# Patient Record
Sex: FEMALE | Race: WHITE | HISPANIC OR LATINO
[De-identification: names, ages, dates, MRNs, and addresses within clinical notes are randomized per-mention and may not be internally consistent; named-entity substitution may affect disease eponyms.]

---

## 2018-03-19 ENCOUNTER — APPOINTMENT (OUTPATIENT)
Dept: ANTEPARTUM | Facility: CLINIC | Age: 33
End: 2018-03-19

## 2018-03-19 PROBLEM — Z00.00 ENCOUNTER FOR PREVENTIVE HEALTH EXAMINATION: Status: ACTIVE | Noted: 2018-03-19

## 2023-05-22 ENCOUNTER — EMERGENCY (EMERGENCY)
Facility: HOSPITAL | Age: 38
LOS: 1 days | Discharge: HOME | End: 2023-05-22
Admitting: EMERGENCY MEDICINE
Payer: MEDICAID

## 2023-05-22 VITALS
RESPIRATION RATE: 17 BRPM | HEART RATE: 84 BPM | OXYGEN SATURATION: 100 % | TEMPERATURE: 98 F | DIASTOLIC BLOOD PRESSURE: 79 MMHG | SYSTOLIC BLOOD PRESSURE: 117 MMHG

## 2023-05-22 LAB
APPEARANCE UR: ABNORMAL
BACTERIA # UR AUTO: ABNORMAL
BILIRUB UR-MCNC: NEGATIVE — SIGNIFICANT CHANGE UP
COLOR SPEC: SIGNIFICANT CHANGE UP
DIFF PNL FLD: ABNORMAL
EPI CELLS # UR: 24 /HPF — HIGH (ref 0–5)
GLUCOSE UR QL: NEGATIVE — SIGNIFICANT CHANGE UP
HYALINE CASTS # UR AUTO: 1 /LPF — SIGNIFICANT CHANGE UP (ref 0–7)
KETONES UR-MCNC: NEGATIVE — SIGNIFICANT CHANGE UP
LEUKOCYTE ESTERASE UR-ACNC: NEGATIVE — SIGNIFICANT CHANGE UP
NITRITE UR-MCNC: NEGATIVE — SIGNIFICANT CHANGE UP
PH UR: 6.5 — SIGNIFICANT CHANGE UP (ref 5–8)
PROT UR-MCNC: ABNORMAL
RBC CASTS # UR COMP ASSIST: 3 /HPF — SIGNIFICANT CHANGE UP (ref 0–4)
SP GR SPEC: 1.02 — SIGNIFICANT CHANGE UP (ref 1.01–1.05)
UROBILINOGEN FLD QL: SIGNIFICANT CHANGE UP
WBC UR QL: 9 /HPF — HIGH (ref 0–5)

## 2023-05-22 PROCEDURE — 99284 EMERGENCY DEPT VISIT MOD MDM: CPT

## 2023-05-22 RX ORDER — HYDROCORTISONE 1 %
1 OINTMENT (GRAM) TOPICAL
Qty: 1 | Refills: 0
Start: 2023-05-22 | End: 2023-05-25

## 2023-05-22 RX ORDER — FAMOTIDINE 10 MG/ML
20 INJECTION INTRAVENOUS ONCE
Refills: 0 | Status: COMPLETED | OUTPATIENT
Start: 2023-05-22 | End: 2023-05-22

## 2023-05-22 RX ADMIN — Medication 30 MILLILITER(S): at 16:51

## 2023-05-22 RX ADMIN — FAMOTIDINE 20 MILLIGRAM(S): 10 INJECTION INTRAVENOUS at 16:51

## 2023-05-22 NOTE — ED ADULT TRIAGE NOTE - CHIEF COMPLAINT QUOTE
Pt c/o of hernia and urinary tract infection for one week. Patient denies any recent treatment for the urinary tract infection. Denies chest pain, headache, dizziness. Hx [id number]

## 2023-05-22 NOTE — ED PROVIDER NOTE - NSFOLLOWUPINSTRUCTIONS_ED_ALL_ED_FT
Follow up with general surgery for abdominal hernia.   Advance activity as tolerated.  Continue all previously prescribed medications as directed unless otherwise instructed.  Follow up with your primary care physician in 48-72 hours- bring copies of your results.  Return to the ER for worsening or persistent symptoms, and/or ANY NEW OR CONCERNING SYMPTOMS: nausea, fever, vomiting, diarrhea, constipation, worsening pain. If you have issues obtaining follow up, please call: [phone] to obtain a doctor or specialist who takes your insurance in your area.  You may call [phone] to make an appointment with the internal medicine clinic.

## 2023-05-22 NOTE — ED PROVIDER NOTE - PATIENT PORTAL LINK FT
You can access the FollowMyHealth Patient Portal offered by [facility] by registering at the following website: [email or web address]. By joining [employer]’s FollowMyHealth portal, you will also be able to view your health information using other applications (apps) compatible with our system.

## 2023-05-22 NOTE — ED PROVIDER NOTE - CLINICAL SUMMARY MEDICAL DECISION MAKING FREE TEXT BOX
pt here for a lump on epigastric area, concern for hernia. also has abnormal discharge. exam found no lump on abdomen. no abdominal wall defect found. abd is soft, nontender. the "lump" that pt points to when getting up is her abdominal muscle and subcutaneous tissue which bulges out when she sits. it is non tender, and goes away when pt sucks in her abdomen.  pt then admits to gaining wt recently and her clothes does not fit. pt has a pcp who checked her thyroid function which was normal.   pt will continue f/u with pcp.  recommended gen surg for hernia as pt request although unlikely.    UA negative for UTI. urine culture pending.

## 2023-05-22 NOTE — ED PROVIDER NOTE - OBJECTIVE STATEMENT
38-year-old female with hypertension, complaining of "a lump" on her epigastric area with pain sometimes for 3 months. The lump will go away when she lays down, but will show up when she sits, walks, and any other activities.  Denies nausea, vomiting, diarrhea, fever.  Patient saw her primary care for this problem and was told to take Tylenol with minimal improvement. last bm was this morning and denies blood in stool.   Patient also complaining of vaginal itchiness for 1 week.  History of kidney stone.  Itchiness is around the vulvar area and goes into the vagina.  Also started to have some brown discharge this morning.  LMP was May 1.  Sexually active with 1 male partner.  Denies pelvic pain, dysuria, urinary frequency or urgency, hematuria.

## 2023-05-22 NOTE — ED PROVIDER NOTE - PHYSICAL EXAMINATION
CONSTITUTIONAL: Well-appearing; well-nourished; in no apparent distress. Non-toxic appearing.   NEURO: Alert & oriented. Gait steady without assistance. Sensory and motor functions are grossly intact.  PSYCH: Mood appropriate. Thought processes intact.   NECK: Supple  CARD: Regular rate and rhythm, no murmurs  RESP: No accessory muscle use; breath sounds clear and equal bilaterally; no wheezes, rhonchi, or rales     ABD: no epigastric or other abdominal mass found. Soft; non-distended; non-tender.   MUSCULOSKELETAL/EXTREMITIES: FROM in all four extremities; no extremity edema.  SKIN: Warm; dry; no apparent lesions or exudate  PELVIC: brown vag discharge. no CMT. non tender. chaperoned by PCA.

## 2023-05-23 LAB
C TRACH RRNA SPEC QL NAA+PROBE: SIGNIFICANT CHANGE UP
CULTURE RESULTS: SIGNIFICANT CHANGE UP
N GONORRHOEA RRNA SPEC QL NAA+PROBE: SIGNIFICANT CHANGE UP
SPECIMEN SOURCE: SIGNIFICANT CHANGE UP

## 2023-09-18 ENCOUNTER — APPOINTMENT (OUTPATIENT)
Dept: SURGERY | Facility: CLINIC | Age: 38
End: 2023-09-18

## 2023-10-02 ENCOUNTER — APPOINTMENT (OUTPATIENT)
Dept: SURGERY | Facility: CLINIC | Age: 38
End: 2023-10-02
Payer: MEDICAID

## 2023-10-02 VITALS
SYSTOLIC BLOOD PRESSURE: 116 MMHG | WEIGHT: 200 LBS | DIASTOLIC BLOOD PRESSURE: 71 MMHG | HEART RATE: 84 BPM | BODY MASS INDEX: 37.76 KG/M2 | TEMPERATURE: 97.9 F | HEIGHT: 61 IN

## 2023-10-02 DIAGNOSIS — I10 ESSENTIAL (PRIMARY) HYPERTENSION: ICD-10-CM

## 2023-10-02 PROCEDURE — 45300 PROCTOSIGMOIDOSCOPY DX: CPT

## 2023-10-02 PROCEDURE — 99203 OFFICE O/P NEW LOW 30 MIN: CPT | Mod: 25

## 2023-10-02 RX ORDER — METOPROLOL TARTRATE 75 MG/1
TABLET, FILM COATED ORAL
Refills: 0 | Status: ACTIVE | COMMUNITY

## 2023-10-02 RX ORDER — LOSARTAN POTASSIUM 100 MG/1
TABLET, FILM COATED ORAL
Refills: 0 | Status: ACTIVE | COMMUNITY

## 2023-10-26 ENCOUNTER — OUTPATIENT (OUTPATIENT)
Dept: OUTPATIENT SERVICES | Facility: HOSPITAL | Age: 38
LOS: 1 days | Discharge: HOME | End: 2023-10-26

## 2023-10-26 VITALS
SYSTOLIC BLOOD PRESSURE: 132 MMHG | RESPIRATION RATE: 18 BRPM | WEIGHT: 203.27 LBS | TEMPERATURE: 98 F | HEIGHT: 61 IN | HEART RATE: 99 BPM | DIASTOLIC BLOOD PRESSURE: 91 MMHG | OXYGEN SATURATION: 98 %

## 2023-10-26 DIAGNOSIS — I10 ESSENTIAL (PRIMARY) HYPERTENSION: ICD-10-CM

## 2023-10-26 DIAGNOSIS — Z01.818 ENCOUNTER FOR OTHER PREPROCEDURAL EXAMINATION: ICD-10-CM

## 2023-10-26 DIAGNOSIS — K43.9 VENTRAL HERNIA WITHOUT OBSTRUCTION OR GANGRENE: ICD-10-CM

## 2023-10-26 LAB
ANION GAP SERPL CALC-SCNC: 6 MMOL/L — SIGNIFICANT CHANGE UP (ref 5–17)
ANION GAP SERPL CALC-SCNC: 6 MMOL/L — SIGNIFICANT CHANGE UP (ref 5–17)
BASOPHILS # BLD AUTO: 0.04 K/UL — SIGNIFICANT CHANGE UP (ref 0–0.2)
BASOPHILS # BLD AUTO: 0.04 K/UL — SIGNIFICANT CHANGE UP (ref 0–0.2)
BASOPHILS NFR BLD AUTO: 0.5 % — SIGNIFICANT CHANGE UP (ref 0–2)
BASOPHILS NFR BLD AUTO: 0.5 % — SIGNIFICANT CHANGE UP (ref 0–2)
BUN SERPL-MCNC: 24 MG/DL — HIGH (ref 7–23)
BUN SERPL-MCNC: 24 MG/DL — HIGH (ref 7–23)
CALCIUM SERPL-MCNC: 8.5 MG/DL — SIGNIFICANT CHANGE UP (ref 8.5–10.1)
CALCIUM SERPL-MCNC: 8.5 MG/DL — SIGNIFICANT CHANGE UP (ref 8.5–10.1)
CHLORIDE SERPL-SCNC: 112 MMOL/L — HIGH (ref 96–108)
CHLORIDE SERPL-SCNC: 112 MMOL/L — HIGH (ref 96–108)
CO2 SERPL-SCNC: 22 MMOL/L — SIGNIFICANT CHANGE UP (ref 22–31)
CO2 SERPL-SCNC: 22 MMOL/L — SIGNIFICANT CHANGE UP (ref 22–31)
CREAT SERPL-MCNC: 1.45 MG/DL — HIGH (ref 0.5–1.3)
CREAT SERPL-MCNC: 1.45 MG/DL — HIGH (ref 0.5–1.3)
EGFR: 47 ML/MIN/1.73M2 — LOW
EGFR: 47 ML/MIN/1.73M2 — LOW
EOSINOPHIL # BLD AUTO: 0.07 K/UL — SIGNIFICANT CHANGE UP (ref 0–0.5)
EOSINOPHIL # BLD AUTO: 0.07 K/UL — SIGNIFICANT CHANGE UP (ref 0–0.5)
EOSINOPHIL NFR BLD AUTO: 0.9 % — SIGNIFICANT CHANGE UP (ref 0–6)
EOSINOPHIL NFR BLD AUTO: 0.9 % — SIGNIFICANT CHANGE UP (ref 0–6)
GLUCOSE SERPL-MCNC: 130 MG/DL — HIGH (ref 70–99)
GLUCOSE SERPL-MCNC: 130 MG/DL — HIGH (ref 70–99)
HCG SERPL-ACNC: <1 MIU/ML — SIGNIFICANT CHANGE UP
HCG SERPL-ACNC: <1 MIU/ML — SIGNIFICANT CHANGE UP
HCT VFR BLD CALC: 34.9 % — SIGNIFICANT CHANGE UP (ref 34.5–45)
HCT VFR BLD CALC: 34.9 % — SIGNIFICANT CHANGE UP (ref 34.5–45)
HGB BLD-MCNC: 11 G/DL — LOW (ref 11.5–15.5)
HGB BLD-MCNC: 11 G/DL — LOW (ref 11.5–15.5)
IMM GRANULOCYTES NFR BLD AUTO: 0.9 % — SIGNIFICANT CHANGE UP (ref 0–0.9)
IMM GRANULOCYTES NFR BLD AUTO: 0.9 % — SIGNIFICANT CHANGE UP (ref 0–0.9)
LYMPHOCYTES # BLD AUTO: 2.05 K/UL — SIGNIFICANT CHANGE UP (ref 1–3.3)
LYMPHOCYTES # BLD AUTO: 2.05 K/UL — SIGNIFICANT CHANGE UP (ref 1–3.3)
LYMPHOCYTES # BLD AUTO: 26.1 % — SIGNIFICANT CHANGE UP (ref 13–44)
LYMPHOCYTES # BLD AUTO: 26.1 % — SIGNIFICANT CHANGE UP (ref 13–44)
MCHC RBC-ENTMCNC: 23.8 PG — LOW (ref 27–34)
MCHC RBC-ENTMCNC: 23.8 PG — LOW (ref 27–34)
MCHC RBC-ENTMCNC: 31.5 G/DL — LOW (ref 32–36)
MCHC RBC-ENTMCNC: 31.5 G/DL — LOW (ref 32–36)
MCV RBC AUTO: 75.5 FL — LOW (ref 80–100)
MCV RBC AUTO: 75.5 FL — LOW (ref 80–100)
MONOCYTES # BLD AUTO: 0.49 K/UL — SIGNIFICANT CHANGE UP (ref 0–0.9)
MONOCYTES # BLD AUTO: 0.49 K/UL — SIGNIFICANT CHANGE UP (ref 0–0.9)
MONOCYTES NFR BLD AUTO: 6.3 % — SIGNIFICANT CHANGE UP (ref 2–14)
MONOCYTES NFR BLD AUTO: 6.3 % — SIGNIFICANT CHANGE UP (ref 2–14)
NEUTROPHILS # BLD AUTO: 5.12 K/UL — SIGNIFICANT CHANGE UP (ref 1.8–7.4)
NEUTROPHILS # BLD AUTO: 5.12 K/UL — SIGNIFICANT CHANGE UP (ref 1.8–7.4)
NEUTROPHILS NFR BLD AUTO: 65.3 % — SIGNIFICANT CHANGE UP (ref 43–77)
NEUTROPHILS NFR BLD AUTO: 65.3 % — SIGNIFICANT CHANGE UP (ref 43–77)
NRBC # BLD: 0 /100 WBCS — SIGNIFICANT CHANGE UP (ref 0–0)
NRBC # BLD: 0 /100 WBCS — SIGNIFICANT CHANGE UP (ref 0–0)
PLATELET # BLD AUTO: 367 K/UL — SIGNIFICANT CHANGE UP (ref 150–400)
PLATELET # BLD AUTO: 367 K/UL — SIGNIFICANT CHANGE UP (ref 150–400)
POTASSIUM SERPL-MCNC: 4 MMOL/L — SIGNIFICANT CHANGE UP (ref 3.5–5.3)
POTASSIUM SERPL-MCNC: 4 MMOL/L — SIGNIFICANT CHANGE UP (ref 3.5–5.3)
POTASSIUM SERPL-SCNC: 4 MMOL/L — SIGNIFICANT CHANGE UP (ref 3.5–5.3)
POTASSIUM SERPL-SCNC: 4 MMOL/L — SIGNIFICANT CHANGE UP (ref 3.5–5.3)
RBC # BLD: 4.62 M/UL — SIGNIFICANT CHANGE UP (ref 3.8–5.2)
RBC # BLD: 4.62 M/UL — SIGNIFICANT CHANGE UP (ref 3.8–5.2)
RBC # FLD: 15.1 % — HIGH (ref 10.3–14.5)
RBC # FLD: 15.1 % — HIGH (ref 10.3–14.5)
SODIUM SERPL-SCNC: 140 MMOL/L — SIGNIFICANT CHANGE UP (ref 135–145)
SODIUM SERPL-SCNC: 140 MMOL/L — SIGNIFICANT CHANGE UP (ref 135–145)
WBC # BLD: 7.84 K/UL — SIGNIFICANT CHANGE UP (ref 3.8–10.5)
WBC # BLD: 7.84 K/UL — SIGNIFICANT CHANGE UP (ref 3.8–10.5)
WBC # FLD AUTO: 7.84 K/UL — SIGNIFICANT CHANGE UP (ref 3.8–10.5)
WBC # FLD AUTO: 7.84 K/UL — SIGNIFICANT CHANGE UP (ref 3.8–10.5)

## 2023-10-26 NOTE — H&P PST ADULT - NSANTHOSAYNRD_GEN_A_CORE
No. [place] screening performed.  STOP BANG Legend: 0-2 = LOW Risk; 3-4 = INTERMEDIATE Risk; 5-8 = HIGH Risk

## 2023-10-26 NOTE — H&P PST ADULT - PROBLEM SELECTOR PLAN 3
Preop instructions provided including NPO status. Hibiclens wash for infection control. Patient aware to stop NSAID, OTC herbals  for 7-10 days, needs to be accompanied  by adult upon discharge.  Patient verbalized understanding  cardio /medical clearance   anesthesiologist to review pst labs, ekg, medical clearances and optimization for surgery

## 2023-10-26 NOTE — H&P PST ADULT - HISTORY OF PRESENT ILLNESS
39 yo  female , pmh- palpitations cardia arrhythmia - with  epigastric hernia - scheduled for  epigastric hernia repair   spoke in patient's native language   denies recent travels in the past 30 days. No fever, SOB, cough, flu like symptoms or body rash- covid screen

## 2023-10-26 NOTE — H&P PST ADULT - ASSESSMENT
epigastric hernia   CAPRINI SCORE    AGE RELATED RISK FACTORS                                                       MOBILITY RELATED FACTORS  [ ] Age 41-60 years                                            (1 Point)                  [ ] Bed rest                                                        (1 Point)  [ ] Age: 61-74 years                                           (2 Points)                [ ] Plaster cast                                                   (2 Points)  [ ] Age= 75 years                                              (3 Points)                 [ ] Bed bound for more than 72 hours                   (2 Points)    DISEASE RELATED RISK FACTORS                                               GENDER SPECIFIC FACTORS  [ ] Edema in the lower extremities                       (1 Point)                  [ ] Pregnancy                                                     (1 Point)  [ ] Varicose veins                                               (1 Point)                  [ ] Post-partum < 6 weeks                                   (1 Point)             [x ] BMI > 25 Kg/m2                                            (1 Point)                  [ ] Hormonal therapy  or oral contraception            (1 Point)                 [ ] Sepsis (in the previous month)                        (1 Point)                  [ ] History of pregnancy complications  [ ] Pneumonia or serious lung disease                                               [ ] Unexplained or recurrent                       (1 Point)           (in the previous month)                               (1 Point)  [ ] Abnormal pulmonary function test                     (1 Point)                 SURGERY RELATED RISK FACTORS  [ ] Acute myocardial infarction                              (1 Point)                 [ ]  Section                                            (1 Point)  [ ] Congestive heart failure (in the previous month)  (1 Point)                 [ ] Minor surgery                                                 (1 Point)   [ ] Inflammatory bowel disease                             (1 Point)                 [ ] Arthroscopic surgery                                        (2 Points)  [ ] Central venous access                                    (2 Points)                [ x] General surgery lasting more than 45 minutes   (2 Points)       [ ] Stroke (in the previous month)                          (5 Points)               [ ] Elective arthroplasty                                        (5 Points)                                                                                                                                               HEMATOLOGY RELATED FACTORS                                                 TRAUMA RELATED RISK FACTORS  [ ] Prior episodes of VTE                                     (3 Points)                 [ ] Fracture of the hip, pelvis, or leg                       (5 Points)  [ ] Positive family history for VTE                         (3 Points)                 [ ] Acute spinal cord injury (in the previous month)  (5 Points)  [ ] Prothrombin 35315 A                                      (3 Points)                 [ ] Paralysis  (less than 1 month)                          (5 Points)  [ ] Factor V Leiden                                             (3 Points)                 [ ] Multiple Trauma within 1 month                         (5 Points)  [ ] Lupus anticoagulants                                     (3 Points)                                                           [ ] Anticardiolipin antibodies                                (3 Points)                                                       [ ] High homocysteine in the blood                      (3 Points)                                             [ ] Other congenital or acquired thrombophilia       (3 Points)                                                [ ] Heparin induced thrombocytopenia                  (3 Points)                                          Total Score [   3       ]  Caprini Score 0-2: Low risk, No VTE Prophylaxis required for most patient's, encourage ambulation  Caprini Score 3-6: At Risk, Pharmacologic VTE prophylaxis is indicated for most patients ( in the absence of a contraindication)  Caprini Score Greater than or = 7: High Risk , pharmacologic VTE is indicated for most patients ( in the absence of a contraindication)    Caprini score indicates that the patient is high risk for VTE event ( score 6 or greater). Surgical patient's in this group will benefit from both pharmacologic prophylaxis and intermittent compression devices . Surgical team will determine the balance between VTE  risk and bleeding risk and other clinical considerations

## 2023-11-07 ENCOUNTER — APPOINTMENT (OUTPATIENT)
Dept: SURGERY | Facility: HOSPITAL | Age: 38
End: 2023-11-07

## 2023-11-09 ENCOUNTER — APPOINTMENT (OUTPATIENT)
Dept: SURGERY | Facility: HOSPITAL | Age: 38
End: 2023-11-09

## 2024-11-11 ENCOUNTER — EMERGENCY (EMERGENCY)
Facility: HOSPITAL | Age: 39
LOS: 1 days | Discharge: HOME | End: 2024-11-11
Attending: STUDENT IN AN ORGANIZED HEALTH CARE EDUCATION/TRAINING PROGRAM
Payer: MEDICAID

## 2024-11-11 VITALS
WEIGHT: 214.07 LBS | TEMPERATURE: 98 F | RESPIRATION RATE: 18 BRPM | OXYGEN SATURATION: 98 % | HEART RATE: 84 BPM | SYSTOLIC BLOOD PRESSURE: 119 MMHG | DIASTOLIC BLOOD PRESSURE: 84 MMHG

## 2024-11-11 PROBLEM — I10 ESSENTIAL (PRIMARY) HYPERTENSION: Chronic | Status: ACTIVE | Noted: 2023-10-26

## 2024-11-11 LAB
ALBUMIN SERPL ELPH-MCNC: 3.7 G/DL — SIGNIFICANT CHANGE UP (ref 3.3–5.2)
ALP SERPL-CCNC: 66 U/L — SIGNIFICANT CHANGE UP (ref 40–120)
ALT FLD-CCNC: 21 U/L — SIGNIFICANT CHANGE UP
ANION GAP SERPL CALC-SCNC: 12 MMOL/L — SIGNIFICANT CHANGE UP (ref 5–17)
AST SERPL-CCNC: 19 U/L — SIGNIFICANT CHANGE UP
BASOPHILS # BLD AUTO: 0.06 K/UL — SIGNIFICANT CHANGE UP (ref 0–0.2)
BASOPHILS NFR BLD AUTO: 0.6 % — SIGNIFICANT CHANGE UP (ref 0–2)
BILIRUB SERPL-MCNC: <0.2 MG/DL — LOW (ref 0.4–2)
BUN SERPL-MCNC: 27.4 MG/DL — HIGH (ref 8–20)
CALCIUM SERPL-MCNC: 9.1 MG/DL — SIGNIFICANT CHANGE UP (ref 8.4–10.5)
CHLORIDE SERPL-SCNC: 107 MMOL/L — SIGNIFICANT CHANGE UP (ref 96–108)
CO2 SERPL-SCNC: 19 MMOL/L — LOW (ref 22–29)
CREAT SERPL-MCNC: 1.74 MG/DL — HIGH (ref 0.5–1.3)
EGFR: 38 ML/MIN/1.73M2 — LOW
EOSINOPHIL # BLD AUTO: 0.09 K/UL — SIGNIFICANT CHANGE UP (ref 0–0.5)
EOSINOPHIL NFR BLD AUTO: 0.8 % — SIGNIFICANT CHANGE UP (ref 0–6)
GLUCOSE SERPL-MCNC: 105 MG/DL — HIGH (ref 70–99)
HCG SERPL-ACNC: <4 MIU/ML — SIGNIFICANT CHANGE UP
HCT VFR BLD CALC: 32.2 % — LOW (ref 34.5–45)
HGB BLD-MCNC: 10.1 G/DL — LOW (ref 11.5–15.5)
IMM GRANULOCYTES NFR BLD AUTO: 1.2 % — HIGH (ref 0–0.9)
LYMPHOCYTES # BLD AUTO: 2.43 K/UL — SIGNIFICANT CHANGE UP (ref 1–3.3)
LYMPHOCYTES # BLD AUTO: 22.9 % — SIGNIFICANT CHANGE UP (ref 13–44)
MCHC RBC-ENTMCNC: 23.7 PG — LOW (ref 27–34)
MCHC RBC-ENTMCNC: 31.4 G/DL — LOW (ref 32–36)
MCV RBC AUTO: 75.6 FL — LOW (ref 80–100)
MONOCYTES # BLD AUTO: 0.91 K/UL — HIGH (ref 0–0.9)
MONOCYTES NFR BLD AUTO: 8.6 % — SIGNIFICANT CHANGE UP (ref 2–14)
NEUTROPHILS # BLD AUTO: 7 K/UL — SIGNIFICANT CHANGE UP (ref 1.8–7.4)
NEUTROPHILS NFR BLD AUTO: 65.9 % — SIGNIFICANT CHANGE UP (ref 43–77)
PLATELET # BLD AUTO: 431 K/UL — HIGH (ref 150–400)
POTASSIUM SERPL-MCNC: 4.6 MMOL/L — SIGNIFICANT CHANGE UP (ref 3.5–5.3)
POTASSIUM SERPL-SCNC: 4.6 MMOL/L — SIGNIFICANT CHANGE UP (ref 3.5–5.3)
PROT SERPL-MCNC: 6.8 G/DL — SIGNIFICANT CHANGE UP (ref 6.6–8.7)
RBC # BLD: 4.26 M/UL — SIGNIFICANT CHANGE UP (ref 3.8–5.2)
RBC # FLD: 16 % — HIGH (ref 10.3–14.5)
SODIUM SERPL-SCNC: 138 MMOL/L — SIGNIFICANT CHANGE UP (ref 135–145)
WBC # BLD: 10.62 K/UL — HIGH (ref 3.8–10.5)
WBC # FLD AUTO: 10.62 K/UL — HIGH (ref 3.8–10.5)

## 2024-11-11 PROCEDURE — 36415 COLL VENOUS BLD VENIPUNCTURE: CPT

## 2024-11-11 PROCEDURE — 99285 EMERGENCY DEPT VISIT HI MDM: CPT | Mod: 25

## 2024-11-11 PROCEDURE — 96374 THER/PROPH/DIAG INJ IV PUSH: CPT

## 2024-11-11 PROCEDURE — 70450 CT HEAD/BRAIN W/O DYE: CPT | Mod: MC

## 2024-11-11 PROCEDURE — 70450 CT HEAD/BRAIN W/O DYE: CPT | Mod: 26,MC

## 2024-11-11 PROCEDURE — 93010 ELECTROCARDIOGRAM REPORT: CPT

## 2024-11-11 PROCEDURE — 93005 ELECTROCARDIOGRAM TRACING: CPT

## 2024-11-11 PROCEDURE — 84702 CHORIONIC GONADOTROPIN TEST: CPT

## 2024-11-11 PROCEDURE — 99053 MED SERV 10PM-8AM 24 HR FAC: CPT

## 2024-11-11 PROCEDURE — 99284 EMERGENCY DEPT VISIT MOD MDM: CPT

## 2024-11-11 PROCEDURE — 80053 COMPREHEN METABOLIC PANEL: CPT

## 2024-11-11 PROCEDURE — T1013: CPT

## 2024-11-11 PROCEDURE — 85025 COMPLETE CBC W/AUTO DIFF WBC: CPT

## 2024-11-11 PROCEDURE — 82962 GLUCOSE BLOOD TEST: CPT

## 2024-11-11 RX ORDER — METOCLOPRAMIDE HCL 10 MG
10 TABLET ORAL ONCE
Refills: 0 | Status: COMPLETED | OUTPATIENT
Start: 2024-11-11 | End: 2024-11-11

## 2024-11-11 RX ORDER — MECLIZINE HCL 25 MG
50 TABLET ORAL ONCE
Refills: 0 | Status: COMPLETED | OUTPATIENT
Start: 2024-11-11 | End: 2024-11-11

## 2024-11-11 RX ORDER — SODIUM CHLORIDE 9 MG/ML
1000 INJECTION, SOLUTION INTRAMUSCULAR; INTRAVENOUS; SUBCUTANEOUS ONCE
Refills: 0 | Status: COMPLETED | OUTPATIENT
Start: 2024-11-11 | End: 2024-11-11

## 2024-11-11 RX ADMIN — SODIUM CHLORIDE 1000 MILLILITER(S): 9 INJECTION, SOLUTION INTRAMUSCULAR; INTRAVENOUS; SUBCUTANEOUS at 03:45

## 2024-11-11 RX ADMIN — Medication 10 MILLIGRAM(S): at 03:45

## 2024-11-11 NOTE — ED PROVIDER NOTE - ATTENDING APP SHARED VISIT CONTRIBUTION OF CARE
40 yo female PMHx HTN, hernia presents to ED c/o multiple medical complaints. Patient reports intermittent headache and dizziness x2 months. Headache nonexertional, dizziness described as room spinning. Dizziness more severe today then normal prompting her presentation to ED for evaluation. No current headache or other focal deficit. Has not followed up as outpatient for these sxms. No further complaints at this time.   Denies syncope, blood thinners, OCP use, visual changes, vomiting, urinary sxms.      Gen: Alert, NAD  Head: NC, AT, EOMI, normal lids/conjunctiva  ENT: normal hearing, patent oropharynx without erythema/exudate, uvula midline  Neck: +supple,  +Trachea midline  Pulm: normal bilateral BS, normal resp effort, no wheeze/stridor/retractions  CV: RRR, no murmur  Abd: soft, NT/ND, +BS  Mskel: no gross deformity/ edema/erythema/cyanosis  Skin: no rash  Neuro: AAOx3, no motor deficits, CN 2-12 intact    IFroylan, evaluated the patient with the PA, and completed the key components of the history and physical exam. I then discussed the management plan with the PA.

## 2024-11-11 NOTE — ED PROVIDER NOTE - PHYSICAL EXAMINATION
General: In NAD, non-toxic/well-appearing.  Skin: No rashes or lesions. Warm, dry, color normal for race.   Head: Normocephalic/atraumatic.   Eyes: Sclera anicteric, conjunctivae clear b/l. PERRLA, EOMI. No nystagmus.  Ears: No external canal edema or erythema. TMs normal appearing b/l.   Neck: Supple, FROM.   Cardio: Rate and rhythm regular. No audible murmur.  Resp: Breath sounds vesicular, symmetrical and without rales, rhonchi or wheezing b/l.  Abd: Non-distended. Soft, non-tender. No rebound, guarding.   MSK: MAEx4. FROM. Strength 5/5 upper and lower extremities.   Neuro: A&Ox3. No slurred speech. Sensation intact to bilateral upper and lower extremities. Normal point-to-point test.

## 2024-11-11 NOTE — ED PROVIDER NOTE - OBJECTIVE STATEMENT
40 yo female PMHx HTN, hernia presents to ED c/o multiple medical complaints. Patient reports intermittent headache and dizziness x2 months. Headache nonexertional, dizziness described as room spinning. Dizziness more severe today prompting presentation to ED. Did not self medicate PTA. No current headache. Has not followed up as outpatient for these sxms. No further complaints at this time.   Denies blood thinners, OCP use, visual changes, vomiting, urinary sxms. 40 yo female PMHx HTN, hernia presents to ED c/o multiple medical complaints. Patient reports intermittent headache and dizziness x2 months. Headache nonexertional, dizziness described as room spinning. Dizziness more severe today prompting presentation to ED. Did not self medicate PTA. No current headache. Has not followed up as outpatient for these sxms. No further complaints at this time.   Denies syncope, blood thinners, OCP use, visual changes, vomiting, urinary sxms.

## 2024-11-11 NOTE — ED PROVIDER NOTE - PATIENT PORTAL LINK FT
You can access the FollowMyHealth Patient Portal offered by [facility] by registering at the following website: [email or web address]. By joining [employer]’s FollowMyHealth portal, you will also be able to view your health information using other applications (apps) compatible with our system.

## 2024-11-11 NOTE — ED PROVIDER NOTE - CLINICAL SUMMARY MEDICAL DECISION MAKING FREE TEXT BOX
40 yo female PMHx HTN, hernia presents to ED c/o intermittent headache and dizziness x2 months. No current headache. Has not followed up as outpatient for these sxms. Normal VS. Neurologically intact. 40 yo female PMHx HTN, hernia presents to ED c/o intermittent headache and dizziness x2 months. No current headache. Has not followed up as outpatient for these sxms. Normal VS. Neurologically intact. CT head negative. EKG NSR - no arrythmia. 40 yo female PMHx HTN, hernia presents to ED c/o intermittent headache and dizziness x2 months. No current headache. Has not followed up as outpatient for these sxms. Normal VS. Neurologically intact. CT head negative. EKG NSR - no arrythmia. Labs significant for elevated creatinine. Noted to be elevated 10/2023, but worsened today. Advised out patient PMD, nephrology follow up. Medically stable for discharge.

## 2024-11-11 NOTE — ED ADULT TRIAGE NOTE - CHIEF COMPLAINT QUOTE
pt ambulates into triage w/steady gait, pt c/o headaches and dizziness x4 days, pt endorses she has not felt well for almost a week, neg. chest pain/SOB//vision changes/abd. pain/N/V/D/fever/chills/cough/congestion, hx of HTN, pt took advil yesterday

## 2024-11-11 NOTE — ED PROVIDER NOTE - PROGRESS NOTE DETAILS
CIARA Rojas: Feeling better. All results including any incidental findings reviewed and discussed with patient and/or guardian. Advised PMD and nephrology follow up. Patient is medically stable for discharge. Patient discharged in stable condition.

## 2024-11-11 NOTE — ED PROVIDER NOTE - NSFOLLOWUPINSTRUCTIONS_ED_ALL_ED_FT
- Ibuprofen 600mg every 6 hours as needed for pain.  - Acetaminophen 650mg every 6 hours as needed for pain.   - Please bring all documentation from your ED visit to any related future follow up appointment.  - Please call to schedule follow up appointment with your primary care physician within 24-48 hours.  - Please seek immediate medical attention or return to the ED for any new/worsening, signs/symptoms, or concerns.        - Ibuprofeno 600 mg cada 6 horas según sea necesario para el dolor.  - Acetaminofén 650 mg cada 6 horas según sea necesario para el dolor.   - Traiga toda la documentación de marsh visita al servicio de urgencias a cualquier kendall de seguimiento futura relacionada.  - Llame para programar yamilka kendall de seguimiento con marsh médico de atención primaria dentro de las 24 a 48 horas.  - Busque atención médica inmediata o regrese al servicio de urgencias si detecta signos, síntomas o inquietudes nuevos o que empeoran. - Acetaminophen 650mg every 6 hours as needed for pain.   - Please call today to schedule follow up appointment with your PMD and nephrologist.   - Please bring all documentation from your ED visit to any related future follow up appointment.  - Please call to schedule follow up appointment with your primary care physician within 24-48 hours.  - Please seek immediate medical attention or return to the ED for any new/worsening, signs/symptoms, or concerns.      - Acetaminofén 650 mg cada 6 horas según sea necesario para el dolor.   - Llame hoy para programar yamilka kendall de seguimiento con marsh PMD y nefrólogo.  - Traiga toda la documentación de marsh visita al servicio de urgencias a cualquier kendall de seguimiento futura relacionada.  - Llame para programar yamilka kendall de seguimiento con marsh médico de atención primaria dentro de las 24 a 48 horas.  - Busque atención médica inmediata o regrese al servicio de urgencias si detecta signos, síntomas o inquietudes nuevos o que empeoran.    Mareos  Dizziness    Los mareos son un problema muy frecuente. Se trata de yamilka sensación de inestabilidad o desvanecimiento. Puede sentir que se va a desmayar. Los mareos pueden provocarle yamilka lesión si se tropieza o se . Las personas de todas las edades pueden sufrir mareos, you es más frecuente en los adultos mayores. Esta afección puede tener muchas causas, entre las que se pueden mencionar los medicamentos, la deshidratación y las enfermedades.    Siga estas indicaciones en marsh casa:      Comida y bebida    Radhika suficiente líquido shantal para mantener la orina micah o de color amarillo pálido. [place] rocio la deshidratación. Trate de beber más líquidos transparentes, shantal agua.  No radhika alcohol.  Limite el consumo de cafeína si el médico se lo indica. Verifique los ingredientes y la información nutricional para saber si un alimento o yamilka bebida contienen cafeína.  Limite el consumo de sal (sodio) si el médico se lo indica. Verifique los ingredientes y la información nutricional para saber si un alimento o yamilka bebida contienen sodio.        Actividad    Evite los movimientos rápidos.    Levántese de las evangelist con lentitud y apóyese hasta sentirse brandee.  Por la mañana, siéntese caroline a un lado de la cama. Cuando se sienta brandee, póngase lentamente de pie mientras se sostiene de algo, hasta que sepa que ha logrado el equilibrio.  Mueva las piernas con frecuencia si debe estar de pie en un lugar flor mucho tiempo. Mientras esté de pie, contraiga y relaje los músculos de las piernas.  No conduzca vehículos ni opere maquinaria pesada si se siente mareado.  Evite agacharse si se siente mareado. En marsh casa, coloque los objetos de modo que le resulte fácil alcanzarlos sin agacharse.        Estilo de pradip    No consuma ningún producto que contenga nicotina o tabaco, shantal cigarrillos y cigarrillos electrónicos. Si necesita ayuda para dejar de fumar, consulte al médico.  Trate de reducir el nivel de estrés con métodos shantal el yoga o la meditación. Hable con el médico si necesita ayuda para controlar el nivel de estrés.        Instrucciones generales    Controle zuri mareos para raegan si hay cambios.  [place] los medicamentos de venta hemant y los recetados solamente shantal se lo haya indicado el médico. Hable con el médico si ne que los medicamentos que está tomando son la causa de zuri mareos.  Infórmele a un amigo o a un familiar si se siente mareado. [place] a esta persona que llame al médico si observa cambios en marsh comportamiento.  Concurra a todas las visitas de seguimiento shantal se lo haya indicado el médico. [place] es importante.    Comuníquese con un médico si:  Los mareos persisten.  Los mareos o la sensación de desvanecimiento empeoran.  Siente náuseas.  Se le redujo la audición.  Aparecen nuevos síntomas.  Cuando está de pie, se siente inestable o que la habitación da vueltas.    Solicite ayuda de inmediato si:  Vomita o tiene diarrea y no puede comer ni beber nada.  Tiene dificultad para hablar, caminar, tragar o usar los brazos, las niraj o las piernas.  Se siente usualmente débil.  No piensa con claridad o tiene dificultad para armar oraciones. Es posible que un amigo o un familiar adviertan que esto ocurre.  Tiene dolor de pecho, dolor abdominal, sudoración o le falta el aire.  Sufre cambios en la visión.  Tiene cualquier tipo de sangrado.  Tiene dolor de woody intenso.  Tiene dolor o rigidez en el john.  Tiene fiebre.    Estos síntomas pueden representar un problema grave que constituye yamilka emergencia. No espere hasta que los síntomas desaparezcan. Solicite atención médica de inmediato. Comuníquese con el servicio de emergencias de marsh localidad (911 en los [place]). No conduzca por zuri propios medios hasta el hospital.    Resumen  Los mareos son yamilka sensación de inestabilidad o desvanecimiento. Esta afección puede tener muchas causas, entre las que se pueden mencionar los medicamentos, la deshidratación y las enfermedades.  Las personas de todas las edades pueden sufrir mareos, you es más frecuente en los adultos mayores.  Radhika suficiente líquido shantal para mantener la orina micah o de color amarillo pálido. No radhika alcohol.  Evite los movimientos rápidos si se siente mareado. Controle zuri mareos para raegan si hay cambios.        Enfermedad renal crónica en adultos  Chronic Kidney Disease, Adult       La enfermedad renal crónica (ERC) se produce cuando los riñones se dañan lentamente flor un período prolongado. Los riñones son un par de órganos que desempeñan muchas funciones importantes en el cuerpo, que incluyen las siguientes:    Eliminar desechos y el exceso de líquido de la lauren para producir orina.  Producir hormonas que mantienen la cantidad de líquido en los tejidos y los vasos sanguíneos.  Mantener la cantidad correcta de líquidos y de sustancias químicas en el cuerpo.    Un pequeño daño renal puede no causar problemas, you un daño mayor puede dificultar o impedir que los riñones funcionen de la manera adecuada. Si no se cat medidas para frenar el daño renal o evitar que empeore, los riñones pueden dejar de funcionar de forma permanente (enfermedad renal terminal o ERT). La mayoría de las veces, la ERC no desaparece, you a menudo puede controlarse. Por lo general, las personas con ERC pueden tener yamilka pradip normal.    ¿Cuáles son las causas?  Las causas más frecuentes de esta afección son la diabetes y la presión arterial liam (hipertensión). Algunas otras causas son las siguientes:    Enfermedades cardíacas y de los vasos sanguíneos (cardiovasculares).  Enfermedades renales, por ejemplo:    Glomerulonefritis.  Nefritis intersticial.  Enfermedad renal poliquística.  Enfermedad vascular renal.  Enfermedades que afectan el sistema inmunitario.  Enfermedades genéticas.  Medicamentos que dañan los riñones, shantal los antinflamatorios no esteroides (SHEMAR).  Estar cerca de sustancias venenosas (tóxicas) o entrar en contacto con ellas.  Yamilka infección urinaria o renal que se repite yamilka y otra vez (recurrente).  Vasculitis. Es la hinchazón (inflamación) de los vasos sanguíneos.  Un problema con el flujo de orina, que puede ser causado por lo siguiente:    Cáncer.  Cálculos renales que se repiten.  Próstata agrandada en los hombres.    ¿Qué incrementa el riesgo?  Es más probable que tenga esta afección en los siguientes casos:    Tiene más de 60 años.  Es reinaldo.  Es afroamericano, hispano, asiático, isleño del Pacífico o indígena norteamericano.  Es fumador actual o exfumador.  Es moody.  Tiene antecedentes familiares de insuficiencia o enfermedad renal.  A menudo layne medicamentos que dañan los riñones.    ¿Cuáles son los signos o los síntomas?  Los síntomas de esta afección incluyen los siguientes:    Hinchazón (edema) de la duy, las piernas, los tobillos o los pies.  Cansancio (letargo) y menos energía.  Náuseas o vómitos.  Confusión o dificultad para concentrarse.  Problemas en la micción, tales shantal:    Sensación de dolor o ardor al orinar.  Disminución de la producción de orina.  Aumento de los deseos de orinar, especialmente por la noche.  Lauren en la orina.  Contracciones y calambres musculares, especialmente en las piernas.  Falta de aire.  Debilidad.  Pérdida del apetito.  Gusto metálico en la boca.  Dificultad para dormir.  Piel seca y que pica.  Bajo recuento de glóbulos rojos (anemia).  Palidez en los párpados y la superficie del gage (conjuntiva).    Los síntomas se desarrollan lentamente y pueden no ser evidentes hasta que el daño renal es grave. Es posible tener yamilka enfermedad renal flor años sin presentar síntomas.    ¿Cómo se diagnostica?  Esta afección se puede diagnosticar en función de lo siguiente:    Análisis de lauren.  Análisis de orina.  Estudios de diagnóstico por imágenes, shantal yamilka ecografía o yamilka exploración por tomografía computarizada (TC).  Un estudio en el que se layne yamilka muestra de tejido de los riñones para examinarla con un microscopio (biopsia de riñón).    Los resultados de griffin estudio ayudarán a que el médico determine la gravedad de la ERC.    ¿Cómo se trata?  No hay delia para la mayoría de los casos de esta afección, you, en general, el tratamiento jennifer los síntomas y rocio o retrasa el progreso de la enfermedad. El tratamiento puede incluir lo siguiente:    Realizar cambios en la dieta, shantal evitar el alcohol, los alimentos salados (sodio) y ricos en potasio, calcio y proteínas.  Medicamentos administrados con estos fines:    Disminuir la presión arterial.  Controlar el nivel de glucemia.  Mejorar la anemia.  Disminuir la hinchazón.  Proteger los huesos.  Mejorar el equilibrio de electrolitos en la lauren.  Eliminar desechos tóxicos del organismo mediante tipos de diálisis si los riñones ya no funcionan (insuficiencia renal).  Tratar otras afecciones que causan la ERC o que la empeoran.    Siga estas indicaciones en marsh casa:      Medicamentos    [place] los medicamentos de venta hemant y los recetados solamente shantal se lo haya indicado el médico. Es posible que sea necesario ajustar la dosis de algunos medicamentos que layne.  No tome ningún medicamento nuevo a menos que lo haya autorizado el médico. Muchos medicamentos pueden empeorar el daño renal.  No tome ningún suplemento vitamínico y mineral, a menos que lo haya autorizado el médico. Muchos suplementos nutricionales pueden empeorar el daño renal.        Instrucciones generales    Siga la dieta indicada por el médico.  No consuma ningún producto que contenga nicotina o tabaco, shantal cigarrillos y cigarrillos electrónicos. Si necesita ayuda para dejar de fumar, consulte al médico.  Controle y realice un seguimiento de marsh presión arterial en casa. Informe los cambios en la presión arterial shantal se lo haya indicado el médico.  Si recibe tratamiento para la diabetes, controle y realice un seguimiento de los niveles de azúcar en la lauren (glucemia) shantal se lo haya indicado el médico.  Mantenga un peso saludable. Si necesita ayuda para lograrlo, consulte a marsh médico.  Comience o continúe un plan de ejercicios. [place] ejercicios al menos 30 minutos al día, 5 días a la semana.  Mantenga zuri inmunizaciones al día shantal se lo haya indicado el médico.  Concurra a todas las visitas de seguimiento shantal se lo haya indicado el médico. [place] es importante.    Dónde encontrar más información  Asociación Americana de Pacientes Renales (American Association of Kidney Patients, AAKP): www.aakp.org  Fundación Nacional del Riñón (National Kidney Foundation): www.kidney.org  Fondo Americano para Problemas Renales (American Kidney Fund): www.akfinc.org  Programa de rehabilitación de Life Options: www.lifeoptions.org y www.kidneyschool.org    Comuníquese con un médico si:  Los síntomas empeoran.  Presenta nuevos síntomas.    Solicite ayuda de inmediato si:  Aparecen síntomas de ERT, por ejemplo:    Gavi de woody.  Entumecimiento en las niraj o en los pies.  Aparecen hematomas con facilidad.  Hipo frecuente.  Dolor en el pecho.  Falta de aire.  Falta de menstruación en las mujeres.  Tiene fiebre.  Disminuye la producción de orina.  Siente dolor o tiene lauren al orinar.    Resumen  La enfermedad renal crónica (ERC) se produce cuando los riñones se dañan lentamente flor un período prolongado.  Las causas más frecuentes de esta afección son la diabetes y la presión arterial liam (hipertensión).  No hay delia para la mayoría de los casos de esta afección, you, en general, el tratamiento jennifer los síntomas y rocio o retrasa el progreso de la enfermedad. El tratamiento puede incluir yamilka combinación de medicamentos y cambios en el estilo de pradip.

## 2024-11-11 NOTE — ED PROVIDER NOTE - CARE PROVIDERS DIRECT ADDRESSES
,[email or web address] [email or web address]

## 2024-11-11 NOTE — ED PROVIDER NOTE - PROVIDER TOKENS
PROVIDER:[TOKEN:[[id number]]] PROVIDER:[TOKEN:[[id number]]],PROVIDER:[TOKEN:[[id number]]]

## 2024-11-11 NOTE — ED PROVIDER NOTE - CARE PROVIDER_API CALL
Triston Kenyon  Neurology  [address] [place] [address], [facility] [address]  [place] [address]  Phone: [phone]  Fax: [phone]  Follow Up Time:    Triston Kenyon  Neurology  [address] [facility], [facility] [address]  [place] [address]  Phone: [phone]  Fax: [phone]  Follow Up Time:     Leo Metcalf  Nephrology  [address] [place] [id number]  Phone: [phone]  Fax: [phone]  Follow Up Time:

## 2024-11-11 NOTE — ED ADULT NURSE NOTE - OBJECTIVE STATEMENT
Assumed care of pt at 0300. Pt is A&Ox4. Respirations are even and unlabored. Pt present to the ED c/o multiple medical complaints. Patient reports intermittent headache and dizziness x2 months. Headache nonexertional, dizziness described as room spinning. Dizziness more severe today prompting presentation to ED.

## 2024-12-19 ENCOUNTER — EMERGENCY (EMERGENCY)
Facility: HOSPITAL | Age: 39
LOS: 1 days | Discharge: HOME | End: 2024-12-19
Attending: STUDENT IN AN ORGANIZED HEALTH CARE EDUCATION/TRAINING PROGRAM
Payer: MEDICAID

## 2024-12-19 VITALS
TEMPERATURE: 98 F | HEIGHT: 61 IN | HEART RATE: 97 BPM | DIASTOLIC BLOOD PRESSURE: 86 MMHG | OXYGEN SATURATION: 94 % | SYSTOLIC BLOOD PRESSURE: 121 MMHG | RESPIRATION RATE: 18 BRPM | WEIGHT: 207.9 LBS

## 2024-12-19 VITALS
HEART RATE: 77 BPM | RESPIRATION RATE: 20 BRPM | OXYGEN SATURATION: 100 % | TEMPERATURE: 98 F | SYSTOLIC BLOOD PRESSURE: 137 MMHG | DIASTOLIC BLOOD PRESSURE: 90 MMHG

## 2024-12-19 LAB
ALBUMIN SERPL ELPH-MCNC: 3.6 G/DL — SIGNIFICANT CHANGE UP (ref 3.3–5.2)
ALP SERPL-CCNC: 77 U/L — SIGNIFICANT CHANGE UP (ref 40–120)
ALT FLD-CCNC: 51 U/L — HIGH
ANION GAP SERPL CALC-SCNC: 15 MMOL/L — SIGNIFICANT CHANGE UP (ref 5–17)
AST SERPL-CCNC: 30 U/L — SIGNIFICANT CHANGE UP
BASOPHILS # BLD AUTO: 0 K/UL — SIGNIFICANT CHANGE UP (ref 0–0.2)
BASOPHILS NFR BLD AUTO: 0 % — SIGNIFICANT CHANGE UP (ref 0–2)
BILIRUB SERPL-MCNC: <0.2 MG/DL — LOW (ref 0.4–2)
BUN SERPL-MCNC: 23.9 MG/DL — HIGH (ref 8–20)
CALCIUM SERPL-MCNC: 9.2 MG/DL — SIGNIFICANT CHANGE UP (ref 8.4–10.5)
CHLORIDE SERPL-SCNC: 104 MMOL/L — SIGNIFICANT CHANGE UP (ref 96–108)
CO2 SERPL-SCNC: 19 MMOL/L — LOW (ref 22–29)
CREAT SERPL-MCNC: 1.29 MG/DL — SIGNIFICANT CHANGE UP (ref 0.5–1.3)
EGFR: 54 ML/MIN/1.73M2 — LOW
ELLIPTOCYTES BLD QL SMEAR: SLIGHT — SIGNIFICANT CHANGE UP
EOSINOPHIL # BLD AUTO: 0.15 K/UL — SIGNIFICANT CHANGE UP (ref 0–0.5)
EOSINOPHIL NFR BLD AUTO: 1.7 % — SIGNIFICANT CHANGE UP (ref 0–6)
GIANT PLATELETS BLD QL SMEAR: PRESENT — SIGNIFICANT CHANGE UP
GLUCOSE SERPL-MCNC: 85 MG/DL — SIGNIFICANT CHANGE UP (ref 70–99)
HCG SERPL-ACNC: <4 MIU/ML — SIGNIFICANT CHANGE UP
HCT VFR BLD CALC: 32.2 % — LOW (ref 34.5–45)
HGB BLD-MCNC: 10.2 G/DL — LOW (ref 11.5–15.5)
LIDOCAIN IGE QN: 62 U/L — HIGH (ref 22–51)
LYMPHOCYTES # BLD AUTO: 1.89 K/UL — SIGNIFICANT CHANGE UP (ref 1–3.3)
LYMPHOCYTES # BLD AUTO: 21.7 % — SIGNIFICANT CHANGE UP (ref 13–44)
MANUAL SMEAR VERIFICATION: SIGNIFICANT CHANGE UP
MCHC RBC-ENTMCNC: 23.3 PG — LOW (ref 27–34)
MCHC RBC-ENTMCNC: 31.7 G/DL — LOW (ref 32–36)
MCV RBC AUTO: 73.5 FL — LOW (ref 80–100)
MICROCYTES BLD QL: SLIGHT — SIGNIFICANT CHANGE UP
MONOCYTES # BLD AUTO: 0.84 K/UL — SIGNIFICANT CHANGE UP (ref 0–0.9)
MONOCYTES NFR BLD AUTO: 9.6 % — SIGNIFICANT CHANGE UP (ref 2–14)
NEUTROPHILS # BLD AUTO: 5.75 K/UL — SIGNIFICANT CHANGE UP (ref 1.8–7.4)
NEUTROPHILS NFR BLD AUTO: 66.1 % — SIGNIFICANT CHANGE UP (ref 43–77)
NRBC # BLD: 1 /100 WBCS — HIGH (ref 0–0)
PLAT MORPH BLD: NORMAL — SIGNIFICANT CHANGE UP
PLATELET # BLD AUTO: 451 K/UL — HIGH (ref 150–400)
POIKILOCYTOSIS BLD QL AUTO: SLIGHT — SIGNIFICANT CHANGE UP
POLYCHROMASIA BLD QL SMEAR: SLIGHT — SIGNIFICANT CHANGE UP
POTASSIUM SERPL-MCNC: 4.3 MMOL/L — SIGNIFICANT CHANGE UP (ref 3.5–5.3)
POTASSIUM SERPL-SCNC: 4.3 MMOL/L — SIGNIFICANT CHANGE UP (ref 3.5–5.3)
PROT SERPL-MCNC: 6.9 G/DL — SIGNIFICANT CHANGE UP (ref 6.6–8.7)
RBC # BLD: 4.38 M/UL — SIGNIFICANT CHANGE UP (ref 3.8–5.2)
RBC # FLD: 15.9 % — HIGH (ref 10.3–14.5)
RBC BLD AUTO: ABNORMAL
SODIUM SERPL-SCNC: 138 MMOL/L — SIGNIFICANT CHANGE UP (ref 135–145)
VARIANT LYMPHS # BLD: 0.9 % — SIGNIFICANT CHANGE UP (ref 0–6)
WBC # BLD: 8.7 K/UL — SIGNIFICANT CHANGE UP (ref 3.8–10.5)
WBC # FLD AUTO: 8.7 K/UL — SIGNIFICANT CHANGE UP (ref 3.8–10.5)

## 2024-12-19 PROCEDURE — 99284 EMERGENCY DEPT VISIT MOD MDM: CPT

## 2024-12-19 PROCEDURE — 85025 COMPLETE CBC W/AUTO DIFF WBC: CPT

## 2024-12-19 PROCEDURE — 84702 CHORIONIC GONADOTROPIN TEST: CPT

## 2024-12-19 PROCEDURE — T1013: CPT

## 2024-12-19 PROCEDURE — 80053 COMPREHEN METABOLIC PANEL: CPT

## 2024-12-19 PROCEDURE — 96374 THER/PROPH/DIAG INJ IV PUSH: CPT

## 2024-12-19 PROCEDURE — 36415 COLL VENOUS BLD VENIPUNCTURE: CPT

## 2024-12-19 PROCEDURE — 83690 ASSAY OF LIPASE: CPT

## 2024-12-19 PROCEDURE — 99284 EMERGENCY DEPT VISIT MOD MDM: CPT | Mod: 25

## 2024-12-19 RX ORDER — PANTOPRAZOLE SODIUM 40 MG/1
1 TABLET, DELAYED RELEASE ORAL
Qty: 14 | Refills: 0
Start: 2024-12-19 | End: 2025-01-01

## 2024-12-19 RX ORDER — SODIUM CHLORIDE 9 MG/ML
1000 INJECTION, SOLUTION INTRAMUSCULAR; INTRAVENOUS; SUBCUTANEOUS ONCE
Refills: 0 | Status: COMPLETED | OUTPATIENT
Start: 2024-12-19 | End: 2024-12-19

## 2024-12-19 RX ORDER — FAMOTIDINE 20 MG/1
20 TABLET, FILM COATED ORAL ONCE
Refills: 0 | Status: COMPLETED | OUTPATIENT
Start: 2024-12-19 | End: 2024-12-19

## 2024-12-19 RX ADMIN — SODIUM CHLORIDE 1000 MILLILITER(S): 9 INJECTION, SOLUTION INTRAMUSCULAR; INTRAVENOUS; SUBCUTANEOUS at 13:40

## 2024-12-19 RX ADMIN — FAMOTIDINE 20 MILLIGRAM(S): 20 TABLET, FILM COATED ORAL at 13:40

## 2024-12-19 NOTE — ED PROVIDER NOTE - PHYSICAL EXAMINATION
General-alert and oriented to person place and time, nontoxic appearing, pleasant cooperative, NAD  HEENT-normocephalic, atraumatic, NT to palp, EOMI, PERRLA, no conjunctival injections,  Neck- supple, trach midline, No JVD, no LAD  Chest- Nt to palp, no reproducible pain  Cardio-s1,s2 present, regular rate and rhythm  Resp- talks in full sentences, symmetrical chest rise, CTA bilat, no evidence of wheezes, rhonchi noted  Abdomen- bowel sounds presnt in all 4 quadrants, soft, NT/ND, no guarding, no rebound tenderness  MSK- moves all extremities, able to ambulate without issues  Back- nt to palp of cervical, thoracic, lumbar spine, nt to palp of paraspinal m., No CVA tenderness  Neuro- no focal deficits, sensation intact

## 2024-12-19 NOTE — ED ADULT NURSE NOTE - HISTORY OF COVID-19 VACCINATION
Vaccine status unknown Assistance OOB with selected safe patient handling equipment/Assistance with ambulation/Communicate Fall Risk and Risk Factors to all staff, patient, and family/Monitor gait and stability/Reinforce activity limits and safety measures with patient and family/Sit up slowly, dangle for a short time, stand at bedside before walking/Use of alarms - bed, chair and/or voice tab/Visual Cue: Yellow wristband/Bed in lowest position, wheels locked, appropriate side rails in place/Call bell, personal items and telephone in reach/Instruct patient to call for assistance before getting out of bed or chair/Non-slip footwear when patient is out of bed/[place] to call system/Physically safe environment - no spills, clutter or unnecessary equipment/Purposeful Proactive Rounding/Room/bathroom lighting operational, light cord in reach

## 2024-12-19 NOTE — ED PROVIDER NOTE - OBJECTIVE STATEMENT
39-year-old female with a history of hypertension CKD presents to the ED complaining of epigastric pain.  Patient notes intermittent epigastric pain for the past 2 months.  Patient notes usually improves after Tylenol.  Last 3 days been having epigastric pain no improvement with Tylenol.  Notes worsening after eating.  No fevers no chills no nausea no vomiting.

## 2024-12-19 NOTE — ED PROVIDER NOTE - CARE PROVIDERS DIRECT ADDRESSES
,[email or web address]

## 2024-12-19 NOTE — ED ADULT NURSE NOTE - OBJECTIVE STATEMENT
pt c/o epigastric pain and nausea  x1 month worsening x1 week , abd soft non distended and tender to epigastric area with palpation,   denies fevers, vomiting or diarrhea

## 2024-12-19 NOTE — ED PROVIDER NOTE - CARE PROVIDER_API CALL
Devonte Clay  Gastroenterology  [address] [facility], [address]  [place] [address]  Phone: [phone]  Fax: [phone]  Follow Up Time:     Sandrine Feliz  Surgical Critical Care  [address] [place] [address]  Phone: [phone]  Fax: [phone]  Follow Up Time:

## 2024-12-19 NOTE — ED PROVIDER NOTE - PATIENT PORTAL LINK FT
You can access the FollowMyHealth Patient Portal offered by [facility] by registering at the following website: [email or web address]. By joining "[employer]’s FollowMyHealth portal, you will also be able to view your health information using other applications (apps) compatible with our system.

## 2024-12-19 NOTE — ED ADULT NURSE NOTE - NS PRO PASSIVE SMOKE EXP
Pt c/o back pain onset 4 days ago, denies injury. States also need med refill due to her missing her physician appt.      Unknown

## 2024-12-19 NOTE — ED PROVIDER NOTE - CLINICAL SUMMARY MEDICAL DECISION MAKING FREE TEXT BOX
39-year-old female with a history of hypertension CKD presents to the ED complaining of epigastric pain. lbas stable, Pt reassessed, pt feeling better at this time, vss, pt able to walk, talk and vocalized plan of action. Discussed in depth and explained to pt in depth the next steps that need to be taking including proper follow up with PCP or specialists. All incidental findings were discussed with pt as well. Pt verbalized their concerns and all questions were answered. Pt understands dispo and wants discharge. Given good instructions when to return to ED and importance of f/u.  patient concerned of possible ventral hernia, no hernia palpated on exam will give f/u with surgeon       - Julio Cesar

## 2024-12-19 NOTE — ED PROVIDER NOTE - ATTENDING APP SHARED VISIT CONTRIBUTION OF CARE
39-year-old female with a history of hypertension CKD presents to the ED complaining of epigastric pain.  Patient notes intermittent epigastric pain for the past 2 months.  Patient notes usually improves after Tylenol.  Last 3 days been having epigastric pain no improvement with Tylenol.  Notes worsening after eating.  No fevers no chills no nausea no vomiting.    I, Froylan Contreras, evaluated the patient with the PA, and completed the key components of the history and physical exam. I then discussed the management plan with the PA.